# Patient Record
Sex: MALE | Race: WHITE | NOT HISPANIC OR LATINO | Employment: OTHER | URBAN - METROPOLITAN AREA
[De-identification: names, ages, dates, MRNs, and addresses within clinical notes are randomized per-mention and may not be internally consistent; named-entity substitution may affect disease eponyms.]

---

## 2017-11-19 ENCOUNTER — HOSPITAL ENCOUNTER (EMERGENCY)
Facility: HOSPITAL | Age: 32
Discharge: HOME | End: 2017-11-19
Attending: EMERGENCY MEDICINE | Admitting: EMERGENCY MEDICINE

## 2017-11-19 VITALS
HEART RATE: 111 BPM | OXYGEN SATURATION: 95 % | DIASTOLIC BLOOD PRESSURE: 89 MMHG | HEIGHT: 73 IN | RESPIRATION RATE: 22 BRPM | BODY MASS INDEX: 38.43 KG/M2 | TEMPERATURE: 98.3 F | SYSTOLIC BLOOD PRESSURE: 130 MMHG | WEIGHT: 290 LBS

## 2017-11-19 DIAGNOSIS — R73.9 HYPERGLYCEMIA: ICD-10-CM

## 2017-11-19 DIAGNOSIS — I47.1 PAROXYSMAL SVT (SUPRAVENTRICULAR TACHYCARDIA) (HCC): Primary | ICD-10-CM

## 2017-11-19 LAB
ALBUMIN SERPL-MCNC: 4.4 G/DL (ref 3.5–5.2)
ALBUMIN/GLOB SERPL: 1.3 G/DL
ALP SERPL-CCNC: 75 U/L (ref 39–117)
ALT SERPL W P-5'-P-CCNC: 21 U/L (ref 1–41)
ANION GAP SERPL CALCULATED.3IONS-SCNC: 16.6 MMOL/L
AST SERPL-CCNC: 18 U/L (ref 1–40)
BASOPHILS # BLD AUTO: 0.03 10*3/MM3 (ref 0–0.2)
BASOPHILS NFR BLD AUTO: 0.2 % (ref 0–1.5)
BILIRUB SERPL-MCNC: 0.2 MG/DL (ref 0.1–1.2)
BUN BLD-MCNC: 19 MG/DL (ref 6–20)
BUN/CREAT SERPL: 18.8 (ref 7–25)
CALCIUM SPEC-SCNC: 9.4 MG/DL (ref 8.6–10.5)
CHLORIDE SERPL-SCNC: 101 MMOL/L (ref 98–107)
CO2 SERPL-SCNC: 24.4 MMOL/L (ref 22–29)
CREAT BLD-MCNC: 1.01 MG/DL (ref 0.76–1.27)
DEPRECATED RDW RBC AUTO: 42.6 FL (ref 37–54)
EOSINOPHIL # BLD AUTO: 0.34 10*3/MM3 (ref 0–0.7)
EOSINOPHIL NFR BLD AUTO: 2.8 % (ref 0.3–6.2)
ERYTHROCYTE [DISTWIDTH] IN BLOOD BY AUTOMATED COUNT: 12.6 % (ref 11.5–14.5)
GFR SERPL CREATININE-BSD FRML MDRD: 86 ML/MIN/1.73
GLOBULIN UR ELPH-MCNC: 3.3 GM/DL
GLUCOSE BLD-MCNC: 166 MG/DL (ref 65–99)
HCT VFR BLD AUTO: 48 % (ref 40.4–52.2)
HGB BLD-MCNC: 15.9 G/DL (ref 13.7–17.6)
IMM GRANULOCYTES # BLD: 0.02 10*3/MM3 (ref 0–0.03)
IMM GRANULOCYTES NFR BLD: 0.2 % (ref 0–0.5)
LYMPHOCYTES # BLD AUTO: 4.47 10*3/MM3 (ref 0.9–4.8)
LYMPHOCYTES NFR BLD AUTO: 36.8 % (ref 19.6–45.3)
MAGNESIUM SERPL-MCNC: 2 MG/DL (ref 1.6–2.6)
MCH RBC QN AUTO: 31.4 PG (ref 27–32.7)
MCHC RBC AUTO-ENTMCNC: 33.1 G/DL (ref 32.6–36.4)
MCV RBC AUTO: 94.7 FL (ref 79.8–96.2)
MONOCYTES # BLD AUTO: 0.96 10*3/MM3 (ref 0.2–1.2)
MONOCYTES NFR BLD AUTO: 7.9 % (ref 5–12)
NEUTROPHILS # BLD AUTO: 6.33 10*3/MM3 (ref 1.9–8.1)
NEUTROPHILS NFR BLD AUTO: 52.1 % (ref 42.7–76)
PLATELET # BLD AUTO: 311 10*3/MM3 (ref 140–500)
PMV BLD AUTO: 10.5 FL (ref 6–12)
POTASSIUM BLD-SCNC: 3.4 MMOL/L (ref 3.5–5.2)
PROT SERPL-MCNC: 7.7 G/DL (ref 6–8.5)
RBC # BLD AUTO: 5.07 10*6/MM3 (ref 4.6–6)
SODIUM BLD-SCNC: 142 MMOL/L (ref 136–145)
TROPONIN T SERPL-MCNC: <0.01 NG/ML (ref 0–0.03)
WBC NRBC COR # BLD: 12.15 10*3/MM3 (ref 4.5–10.7)

## 2017-11-19 PROCEDURE — 99284 EMERGENCY DEPT VISIT MOD MDM: CPT

## 2017-11-19 PROCEDURE — 96361 HYDRATE IV INFUSION ADD-ON: CPT

## 2017-11-19 PROCEDURE — 80053 COMPREHEN METABOLIC PANEL: CPT | Performed by: EMERGENCY MEDICINE

## 2017-11-19 PROCEDURE — 85025 COMPLETE CBC W/AUTO DIFF WBC: CPT | Performed by: EMERGENCY MEDICINE

## 2017-11-19 PROCEDURE — 96374 THER/PROPH/DIAG INJ IV PUSH: CPT

## 2017-11-19 PROCEDURE — 93010 ELECTROCARDIOGRAM REPORT: CPT | Performed by: INTERNAL MEDICINE

## 2017-11-19 PROCEDURE — 93005 ELECTROCARDIOGRAM TRACING: CPT | Performed by: EMERGENCY MEDICINE

## 2017-11-19 PROCEDURE — 84484 ASSAY OF TROPONIN QUANT: CPT | Performed by: EMERGENCY MEDICINE

## 2017-11-19 PROCEDURE — 83735 ASSAY OF MAGNESIUM: CPT | Performed by: EMERGENCY MEDICINE

## 2017-11-19 RX ORDER — METOPROLOL SUCCINATE 25 MG/1
25 TABLET, EXTENDED RELEASE ORAL DAILY
Qty: 30 TABLET | Refills: 0 | Status: SHIPPED | OUTPATIENT
Start: 2017-11-19

## 2017-11-19 RX ORDER — SODIUM CHLORIDE 0.9 % (FLUSH) 0.9 %
10 SYRINGE (ML) INJECTION AS NEEDED
Status: DISCONTINUED | OUTPATIENT
Start: 2017-11-19 | End: 2017-11-19 | Stop reason: HOSPADM

## 2017-11-19 RX ORDER — ADENOSINE 3 MG/ML
INJECTION, SOLUTION INTRAVENOUS
Status: DISCONTINUED
Start: 2017-11-19 | End: 2017-11-19 | Stop reason: WASHOUT

## 2017-11-19 RX ADMIN — METOROPROLOL TARTRATE 5 MG: 5 INJECTION, SOLUTION INTRAVENOUS at 02:16

## 2017-11-19 RX ADMIN — SODIUM CHLORIDE 1000 ML: 9 INJECTION, SOLUTION INTRAVENOUS at 02:06

## 2017-11-19 NOTE — DISCHARGE INSTRUCTIONS
Avoid drinking caffeine.  Take medication as prescribed.  Call [place] cardiology tomorrow to schedule a follow-up appointment.  Call [facility] to obtain a primary care physician for follow-up.  Your blood sugar was elevated while in the emergency department and will need to be rechecked.  Return to the emergency department for shortness of breath, palpitations, dizziness, fainting, chest pain, or other concern.

## 2017-11-19 NOTE — ED PROVIDER NOTES
EMERGENCY DEPARTMENT ENCOUNTER    CHIEF COMPLAINT  Chief Complaint: Rapid heart rate  History given by: patient   History limited by: n/a  Room Number: 16/16  PMD: No Known Provider      HPI:  Pt is a 32 y.o. male who presents complaining of palpitations that began about 20 minutes ago He describes the palpitations as a rapid HR. Pt reports 1-2 similar episodes in the past year, but has not been evaluated for the sx. Pt reports mild dizziness and mild SOA, but denies CP or any other sx. Pt reports caffeine use, but denies any other stimulant use.       Duration:  20 minutes   Onset: sudden  Timing: constant   Quality: rapid heart rate  Intensity/Severity: severe   Progression: unchanged   Associated Symptoms: mild dizziness, mild SOA  Aggravating Factors: none  Alleviating Factors: none  Previous Episodes: pt reports a hx of similar, but has not been evaluation   Treatment before arrival: none    PAST MEDICAL HISTORY  Active Ambulatory Problems     Diagnosis Date Noted   • No Active Ambulatory Problems     Resolved Ambulatory Problems     Diagnosis Date Noted   • No Resolved Ambulatory Problems     No Additional Past Medical History       PAST SURGICAL HISTORY  History reviewed. No pertinent surgical history.    FAMILY HISTORY  History reviewed. No pertinent family history.    SOCIAL HISTORY  Social History     Social History   • Marital status:      Spouse name: N/A   • Number of children: N/A   • Years of education: N/A     Occupational History   • Not on file.     Social History Main Topics   • Smoking status: Never Smoker   • Smokeless tobacco: Not on file   • Alcohol use Yes   • Drug use: No   • Sexual activity: Defer     Other Topics Concern   • Not on file     Social History Narrative   • No narrative on file       ALLERGIES  Review of patient's allergies indicates no known allergies.    REVIEW OF SYSTEMS  Review of Systems   Constitutional: Negative for chills and fever.   HENT: Negative for  congestion and sore throat.    Eyes: Negative.    Respiratory: Positive for shortness of breath (mild). Negative for cough.    Cardiovascular: Positive for palpitations. Negative for chest pain and leg swelling.   Gastrointestinal: Negative for abdominal pain, diarrhea and vomiting.   Genitourinary: Negative for difficulty urinating and dysuria.   Musculoskeletal: Negative for back pain and neck pain.   Skin: Negative for rash and wound.   Allergic/Immunologic: Negative.    Neurological: Positive for dizziness (mild). Negative for weakness, numbness and headaches.   Psychiatric/Behavioral: Negative.    All other systems reviewed and are negative.      PHYSICAL EXAM  ED Triage Vitals   Temp Heart Rate Resp BP SpO2   -- 11/19/17 0146 11/19/17 0146 -- 11/19/17 0146    200 22  100 %      Temp src Heart Rate Source Patient Position BP Location FiO2 (%)   -- 11/19/17 0146 -- -- --    Monitor          Physical Exam   Constitutional: He is oriented to person, place, and time and well-developed, well-nourished, and in no distress.   HENT:   Head: Normocephalic and atraumatic.   Eyes: EOM are normal. Pupils are equal, round, and reactive to light.   Neck: Normal range of motion. Neck supple.   Cardiovascular: Regular rhythm and normal heart sounds.  Tachycardia present.    Pulmonary/Chest: Effort normal and breath sounds normal. No respiratory distress.   Abdominal: Soft. There is no tenderness. There is no rebound and no guarding.   Musculoskeletal: Normal range of motion. He exhibits no edema.   Neurological: He is alert and oriented to person, place, and time. He has normal sensation and normal strength.   Skin: Skin is warm and dry.   Psychiatric: Mood and affect normal.   Nursing note and vitals reviewed.      LAB RESULTS  Lab Results (last 24 hours)     Procedure Component Value Units Date/Time    CBC & Differential [[id number]] Collected:  11/19/17 0153    Specimen:  Blood Updated:  11/19/17 0206    Narrative:       The  following orders were created for panel order CBC & Differential.  Procedure                               Abnormality         Status                     ---------                               -----------         ------                     CBC Auto Differential[[id number]]        Abnormal            Final result                 Please view results for these tests on the individual orders.    Comprehensive Metabolic Panel [[id number]]  (Abnormal) Collected:  11/19/17 0153    Specimen:  Blood Updated:  11/19/17 0238     Glucose 166 (H) mg/dL      BUN 19 mg/dL      Creatinine 1.01 mg/dL      Sodium 142 mmol/L      Potassium 3.4 (L) mmol/L      Chloride 101 mmol/L      CO2 24.4 mmol/L      Calcium 9.4 mg/dL      Total Protein 7.7 g/dL      Albumin 4.40 g/dL      ALT (SGPT) 21 U/L      AST (SGOT) 18 U/L       Specimen hemolyzed.  Results may be affected.        Alkaline Phosphatase 75 U/L      Total Bilirubin 0.2 mg/dL      eGFR Non African Amer 86 mL/min/1.73      Globulin 3.3 gm/dL      A/G Ratio 1.3 g/dL      BUN/Creatinine Ratio 18.8     Anion Gap 16.6 mmol/L     Magnesium [[id number]]  (Normal) Collected:  11/19/17 0153    Specimen:  Blood Updated:  11/19/17 0227     Magnesium 2.0 mg/dL     Troponin [[id number]]  (Normal) Collected:  11/19/17 0153    Specimen:  Blood Updated:  11/19/17 0227     Troponin T <0.010 ng/mL     Narrative:       Troponin T Reference Ranges:  Less than 0.03 ng/mL:    Negative for AMI  0.03 to 0.09 ng/mL:      Indeterminant for AMI  Greater than 0.09 ng/mL: Positive for AMI    CBC Auto Differential [[id number]]  (Abnormal) Collected:  11/19/17 0153    Specimen:  Blood Updated:  11/19/17 0206     WBC 12.15 (H) 10*3/mm3      RBC 5.07 10*6/mm3      Hemoglobin 15.9 g/dL      Hematocrit 48.0 %      MCV 94.7 fL      MCH 31.4 pg      MCHC 33.1 g/dL      RDW 12.6 %      RDW-SD 42.6 fl      MPV 10.5 fL      Platelets 311 10*3/mm3      Neutrophil % 52.1 %      Lymphocyte % 36.8 %      Monocyte % 7.9 %       Eosinophil % 2.8 %      Basophil % 0.2 %      Immature Grans % 0.2 %      Neutrophils, Absolute 6.33 10*3/mm3      Lymphocytes, Absolute 4.47 10*3/mm3      Monocytes, Absolute 0.96 10*3/mm3      Eosinophils, Absolute 0.34 10*3/mm3      Basophils, Absolute 0.03 10*3/mm3      Immature Grans, Absolute 0.02 10*3/mm3           I ordered the above labs and reviewed the results    PROCEDURES  Critical Care  Performed by: KRISTIAN HARRIS  Authorized by: KRISTIAN HARRIS     Critical care provider statement:     Critical care time (minutes):  30    Critical care time was exclusive of:  Separately billable procedures and treating other patients and teaching time    Critical care was necessary to treat or prevent imminent or life-threatening deterioration of the following conditions:  Cardiac failure    Critical care was time spent personally by me on the following activities:  Ordering and performing treatments and interventions, ordering and review of laboratory studies, development of treatment plan with patient or surrogate, evaluation of patient's response to treatment, examination of patient, obtaining history from patient or surrogate and re-evaluation of patient's condition          EKG           EKG time: 01:54  Rhythm/Rate: Sinus tach 117  P waves and CO: nml  QRS, axis: nml axis, inferior q waves    ST and T waves: non specific t wave changes, no ST depression or elevation.      Interpreted Contemporaneously by me, independently viewed  No prior    PROGRESS AND CONSULTS  ED Course     01:46  QR=711's. SVT seen on the monitor. Pt converted to sinus tach with vagal maneuver. HR is now in the 120's. He reports improvement of SOA. Advised him of the plan for labs. Will continue to monitor. Pt understands and agrees with the plan, all questions answered.    01;50  EKG, troponin, magnesium, CMP, and CBC ordered. IVF ordered for hydration.     02:10  Lopressor ordered.     02:27  BP- 141/98 HR- 98 Temp- 98.3 °F (36.8  °C) (Oral) O2 sat- 98%  Rechecked the patient who is in NAD and is resting comfortably. Pt reports that all sx have resolved. Awaiting lab results. Pt understands and agrees with the plan, all questions answered.    03:26  BP- 128/87 HR- 111 Temp- 98.3 °F (36.8 °C) (Oral) O2 sat- 99%  Rechecked the patient who is in NAD and is resting comfortably. Advised pt that his labs are unremarkable other than an elevated blood glucose. Pt is to f/u with a PMD for recheck. Plan to start pt on a beta blocker. Pt is instructed to limit caffeine use. Plan to provide referral for cardiology and a PMD for f/u. Pt understands and agrees with the plan, all questions answered.    MEDICAL DECISION MAKING  Results were reviewed/discussed with the patient and they were also made aware of online access. Pt also made aware that some labs, such as cultures, will not be resulted during ER visit and follow up with PMD is necessary.     MDM  Number of Diagnoses or Management Options  Hyperglycemia:   Paroxysmal SVT (supraventricular tachycardia):   Diagnosis management comments: Patient presented to the ER complaining of rapid heart rate.  He was found to be in SVT.  Patient converted to sinus tachycardia with a vagal maneuver.  He was given IV Lopressor and IV fluids.  Labs were unremarkable except for mildly elevated glucose.  Patient will be given a prescription for Toprol-XL 25 mg daily.  He will be referred to cardiology for follow-up.  He will also be referred to [facility].  He was advised to have his blood glucose rechecked.  Patient was also advised to stop drinking caffeine.  Critical care was performed on this patient.       Amount and/or Complexity of Data Reviewed  Clinical lab tests: ordered and reviewed (Troponin is <0.010, Blood glucose=166)  Tests in the medicine section of CPT®: reviewed and ordered (See EKG procedure note. )    Critical Care  Total time providing critical care: 30-74 minutes    Patient Progress  Patient progress:  stable         DIAGNOSIS  Final diagnoses:   Paroxysmal SVT (supraventricular tachycardia)   Hyperglycemia       DISPOSITION  DISCHARGE    Patient discharged in stable condition.    Reviewed implications of results, diagnosis, meds, responsibility to follow up, warning signs and symptoms of possible worsening, potential complications and reasons to return to ER.    Patient/Family voiced understanding of above instructions.    Discussed plan for discharge, as there is no emergent indication for admission.  Pt/family is agreeable and understands need for follow up and repeat testing.  Pt is aware that discharge does not mean that nothing is wrong but it indicates no emergency is present that requires admission and they must continue care with follow-up as given below or physician of their choice.     FOLLOW-UP  [facility] CARDIOLOGY  [address]  [facility] [address] [phone]  Call in 1 day  supraventricular tachycardia    [facility]YS[facility]  [facility] [address]  [phone]  Schedule an appointment as soon as possible for a visit           Medication List      New Prescriptions          metoprolol succinate XL 25 MG 24 hr tablet   Commonly known as:  TOPROL-XL   Take 1 tablet by mouth Daily.           Latest Documented Vital Signs:  As of 4:15 AM  BP- 130/89 HR- 111 Temp- 98.3 °F (36.8 °C) (Oral) O2 sat- 95%    --  Documentation assistance provided by shara Og for Dr Lees.  Information recorded by the shara was done at my direction and has been verified and validated by me.        Savana Og  11/19/17 0331       Dimitry Lees MD  11/19/17 0415